# Patient Record
Sex: FEMALE | Race: WHITE | NOT HISPANIC OR LATINO | Employment: UNEMPLOYED | URBAN - METROPOLITAN AREA
[De-identification: names, ages, dates, MRNs, and addresses within clinical notes are randomized per-mention and may not be internally consistent; named-entity substitution may affect disease eponyms.]

---

## 2020-10-25 ENCOUNTER — HISTORICAL (OUTPATIENT)
Dept: ADMINISTRATIVE | Facility: HOSPITAL | Age: 55
End: 2020-10-25

## 2020-11-16 ENCOUNTER — HISTORICAL (OUTPATIENT)
Dept: ADMINISTRATIVE | Facility: HOSPITAL | Age: 55
End: 2020-11-16

## 2020-11-19 LAB — FINAL CULTURE: NO GROWTH

## 2021-03-06 ENCOUNTER — HISTORICAL (OUTPATIENT)
Dept: ADMINISTRATIVE | Facility: HOSPITAL | Age: 56
End: 2021-03-06

## 2021-03-08 LAB — FINAL CULTURE: NORMAL

## 2021-10-02 ENCOUNTER — HISTORICAL (OUTPATIENT)
Dept: ADMINISTRATIVE | Facility: HOSPITAL | Age: 56
End: 2021-10-02

## 2021-10-03 ENCOUNTER — HISTORICAL (OUTPATIENT)
Dept: ADMINISTRATIVE | Facility: HOSPITAL | Age: 56
End: 2021-10-03

## 2021-10-05 LAB — FINAL CULTURE: NORMAL

## 2021-10-06 LAB — FINAL CULTURE: NO GROWTH

## 2022-03-15 ENCOUNTER — TELEPHONE (OUTPATIENT)
Dept: GASTROENTEROLOGY | Facility: CLINIC | Age: 57
End: 2022-03-15
Payer: MEDICAID
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
## 2022-03-15 NOTE — TELEPHONE ENCOUNTER
Returned call as requesting. Patient wanting to be scheduled in the GI department. Patient not in [facility] system with the exception of 1 ED visit 6 months ago for anxiety.

## 2022-04-30 NOTE — ED PROVIDER NOTES
"   Patient:   Sarah Downing            MRN: [id number]            FIN: [id number]               Age:   55 years     Sex:  Female     :  1965   Associated Diagnoses:   Acute cystitis; Diverticulitis of colon   Author:   Jose Bills MD      Basic Information   Time seen: Date & time 2020 19:12:00.   History source: Patient.   Arrival mode: Ambulance.   History limitation: None.   Additional information: Chief Complaint from Nursing Triage Note : Chief Complaint   2020 19:10 CST     Chief Complaint           Pt presents via [facility] w/ intermittent RLQ pain over the past couple of day. Burning w/ urination. Pt AAOX4 vss.  .   Provider/Visit info:   Time Seen:  Jose Bills MD / 2020 19:09  .   History of Present Illness   The patient presents with abdominal pain.  The onset was 3  days ago.  The course/duration of symptoms is fluctuating in intensity.  The character of symptoms is sharp.  The degree at onset was moderate.  The Location of pain at onset was right, lower and abdominal.  The degree at present is moderate.  The Location of pain at present is right, lower and abdominal.  Radiating pain: none. There are exacerbating factors including changing position and urination.  The relieving factor is none.  Therapy today: none.  Associated symptoms: diarrhea, denies nausea, denies vomiting, denies back pain, denies shortness of breath, denies fever and denies chills.      55-year-old female presents emergency room complaints of abdominal pain.  Symptoms began 3 days ago.  Describes it as sharp and "hurting pain".  Pain worse with urination.  Reports recently being diagnosed with a urinary tract infection, currently on Macrobid x2 days.  Denies nausea or vomiting.  Reports initially having constipation, but now having loose bowel movements for 1 day..        Review of Systems   Constitutional symptoms:  No fever, no chills.    Respiratory symptoms:  No shortness " of breath, no orthopnea.    Cardiovascular symptoms:  No chest pain, no palpitations.    Gastrointestinal symptoms:  Negative except as documented in HPI.   Genitourinary symptoms:  Dysuria, no hematuria, no vaginal bleeding.    Neurologic symptoms:  No headache, no dizziness.              Additional review of systems information: All other systems reviewed and otherwise negative.      Health Status   Allergies:    Allergic Reactions (Selected)  Severity Not Documented  HYDROcodone/potassium guaiacolsulfonate/PSE- No reactions were documented.  Sulfa drugs- No reactions were documented.,    Allergies (2) Active Reaction  HYDROcodone/potassium None Documented  guaiacolsulfonate/PSE  sulfa drugs None Documented  .   Medications:  (Selected)   Inpatient Medications  Ordered  Toradol: 30 mg, form: Injection, IV Push, Now, first dose 20 19:16:00 CST, stop date 20 19:16:00 CST, STAT  Documented Medications  Documented  Advair Diskus 100 mcg-50 mcg inhalation powder: 0 Refill(s)  Flonase 50 mcg/inh nasal spray: 1 spray(s), Nasal, BID, 0 Refill(s)  Xopenex 1.25 mg/3 mL inhalation solution: 1.25 mg = 3 mL, NEB, TID, 0 Refill(s)  ZyrTEC: 0 Refill(s).      Past Medical/ Family/ Social History   Medical history:    Active  COPD (chronic obstructive pulmonary disease) ([id number]), Reviewed as documented in chart.   Surgical history:     section ([id number]).  Hysterectomy ([id number])., Reviewed as documented in chart.   Family history:    No family history items have been selected or recorded., Reviewed as documented in chart.   Social history:    Social & Psychosocial Habits    Alcohol  10/27/2016  Frequency: 1-2 times per week    Average drinks per episode in last year: 3    Home/Environment  10/27/2016  Lives with: Children, one of my friends    Substance Use  10/27/2016  Use: Never    Tobacco  10/27/2016  Tobacco use per day: 13    Number of years: 25    10/25/2020  Use: 10 or  more cigarettes (1/    Patient Wants Consult For Cessation Counseling No    11/16/2020  Use: 10 or more cigarettes (1/    Patient Wants Consult For Cessation Counseling Yes    Abuse/Neglect  10/25/2020  SHX Any signs of abuse or neglect No    11/16/2020  SHX Any signs of abuse or neglect No    Feels unsafe at home: No    Safe place to go: Yes  , Reviewed as documented in chart.   Problem list:    Active Problems (2)  COPD (chronic obstructive pulmonary disease)   Tobacco user   .      Physical Examination               Vital Signs   Vital Signs   11/16/2020 19:10 CST     Temperature Oral          36.8 DegC                             Temperature Oral (calculated)             98.24 DegF                             SpO2                      99 %                             Oxygen Therapy            Room air                             Systolic Blood Pressure   125 mmHg                             Diastolic Blood Pressure  66 mmHg  .      No qualifying data available.   General:  Alert, no acute distress.    Skin:  Intact, moist.    Head:  Normocephalic, atraumatic.    Neck:  Supple, trachea midline, no tenderness.    Eye:  Pupils are equal, round and reactive to light, extraocular movements are intact, normal conjunctiva.    Ears, nose, mouth and throat:  Oral mucosa moist, no pharyngeal erythema or exudate.    Cardiovascular:  Regular rate and rhythm, No murmur, Normal peripheral perfusion.    Respiratory:  Lungs are clear to auscultation, respirations are non-labored, breath sounds are equal, Symmetrical chest wall expansion.    Gastrointestinal:  Soft, Non distended, Normal bowel sounds, No organomegaly, Tenderness: Mild, right lower quadrant, Guarding: Negative, Rebound: Negative.    Neurological:  Alert and oriented to person, place, time, and situation, No focal neurological deficit observed.    Psychiatric:  Cooperative, appropriate mood & affect.       Medical Decision Making   Differential Diagnosis:   Abdominal pain, Appendicitis, urinary tract infection, pyelonephritis, diverticulitis.    Documents reviewed:  Emergency department nurses' notes.   Results review:  Lab results : Lab View   11/16/2020 20:25 CST     Est Creat Clearance Ser   67.46 mL/min    11/16/2020 19:40 CST     Sodium Lvl                140 mmol/L                             Potassium Lvl             3.9 mmol/L                             Chloride                  105 mmol/L                             CO2                       24 mmol/L                             Calcium Lvl               8.9 mg/dL                             Glucose Lvl               90 mg/dL                             BUN                       10.0 mg/dL                             Creatinine                0.77 mg/dL                             eGFR-AA                   101                             eGFR-SHAN                  83 mL/min/1.73 m2  LOW                             Bili Total                0.4 mg/dL                             Bili Direct               0.2 mg/dL                             Bili Indirect             0.20 mg/dL                             AST                       18 unit/L                             ALT                       22 unit/L                             Alk Phos                  104 unit/L                             Total Protein             7.2 gm/dL                             Albumin Lvl               3.6 gm/dL                             Globulin                  3.6 gm/dL  HI                             A/G Ratio                 1.0 ratio  LOW                             WBC                       4.9 x10(3)/mcL                             RBC                       4.05 x10(6)/mcL                             Hgb                       12.9 gm/dL                             Hct                       40.1 %                             Platelet                  314 x10(3)/mcL                             MCV                       99.0  fL                             MCH                       31.9 pg                             MCHC                      32.2 gm/dL                             RDW                       12.9 %                             MPV                       9.8 fL                             Abs Neut                  2.38 x10(3)/mcL                             Neutro Auto               48 %  NA                             Lymph Auto                41 %  NA                             Mono Auto                 8 %  NA                             Eos Auto                  2 %  NA                             Abs Eos                   0.1 x10(3)/mcL                             Basophil Auto             1 %  NA                             Abs Neutro                2.38 x10(3)/mcL                             Abs Lymph                 2.0 x10(3)/mcL                             Abs Mono                  0.4 x10(3)/mcL                             Abs Baso                  0.0 x10(3)/mcL                             IG%                       0 %  NA                             IG#                       0.010  NA    11/16/2020 19:19 CST     UA Appear                 Clear                             UA Color                  DARK YELLOW                             UA Spec Grav              1.003  LOW                             UA Bili                   Negative                             UA pH                     6.5                             UA Urobilinogen           Normal                             UA Blood                  Negative                             UA Glucose                Negative                             UA Ketones                Negative                             UA Protein                Negative                             UA Nitrite                Negative                             UA Leuk Est               500 Paolo/uL                             UA WBC Interp             26-50 /HPF                              UA RBC Interp             0-2 /HPF                             UA Bact Interp            None Seen /HPF                             UA Squam Epi Interp        /LPF                             UA Hyal Cast Interp       0-2 /LPF  .   Radiology results:  Rad Results (ST)  < 12 hrs   Accession: [id number]  Order: CT Abdomen and Pelvis W Contrast  Report Dt/Tm: 11/16/2020 21:03  Report:   CT of the abdomen and pelvis without oral after IV contrast only     Clinical history is abdominal pain     Lung bases appear clear liver appears normal. Spleen appears normal.  Pancreas appears normal. Biliary system appears normal. The adrenals  are not enlarged. Kidneys appear normal. Aorta shows no evidence of an  aneurysm. There are diverticulum in the colon without evidence of  acute diverticulitis. The appendix appears normal. There is what is  felt to represent a Schmorl's noted L4.     IMPRESSION: Diverticulosis without evidence of acute diverticulitis.    .      Reexamination/ Reevaluation   Time: 11/16/2020 21:43:00 .   Notes: Feeling improved.  CT scan show diverticulosis.  With diarrhea and RLQ abdominal pain, will place on augmentin for duel coverage of diverticulitis and UTI..      Impression and Plan   Diagnosis   Acute cystitis ([id number] N30.00)   Diverticulitis of colon ([id number] K57.32)   Plan   Condition: Improved, Stable.    Disposition: Discharged: Time  11/16/2020 21:44:00, to home.    Prescriptions: Launch prescriptions   Pharmacy:  Zofran ODT 4 mg oral tablet, disintegrating (Prescribe): 4 mg = 1 tab(s), Oral, q6hr, PRN PRN nausea/vomiting, X 5 day(s), # 10 tab(s), 0 Refill(s)  ibuprofen 800 mg oral tablet (Prescribe): 800 mg = 1 tab(s), Oral, q8hr, PRN PRN as needed for pain, not to exceed 3200 mg/day, X 10 day(s), # 30 tab(s), 0 Refill(s)  Augmentin 875 mg-125 mg oral tablet (Prescribe): = 1 tab(s), Oral, q12hr, X 10 day(s), # 20 tab(s), 0 Refill(s).    Patient was given the following educational  materials: Urinary Tract Infection, Adult, Diverticulitis.    Follow up with: Anytime the conditions worsen, return to clinic; Call office to Schedule Appointment; Report to Emergency Department if symptoms return or worsen.    Counseled: Patient, Regarding diagnosis, Regarding diagnostic results, Regarding treatment plan, Regarding prescription, Patient indicated understanding of instructions.    Orders: Launch Orders   Admit/Transfer/Discharge:  Discharge (Order): 11/16/2020 21:45 CST, Home.

## 2022-04-30 NOTE — ED PROVIDER NOTES
Patient:   Sarah Downing            MRN: [id number]            FIN: [id number]               Age:   56 years     Sex:  Female     :  1965   Associated Diagnoses:   Constipation; Methamphetamine use; Left sided abdominal pain   Author:   Kiran Story MD, Rafat Sweet      Basic Information   Time seen: Date & time 10/2/2021 20:11:00, Immediately upon arrival.   History source: Patient, EMS.   Arrival mode: Ambulance.   History limitation: None.   Additional information: Chief Complaint from Nursing Triage Note : Chief Complaint   10/2/2021 20:08 CDT      Chief Complaint           c/o LLQ abd pain for 5 months  .      History of Present Illness   The patient presents with abdominal pain.  The onset was chronic.  The course/duration of symptoms is fluctuating in intensity.  The character of symptoms is crampy.  The degree at onset was minimal.  The Location of pain at onset was left and abdominal.  The degree at present is minimal.  The Location of pain at present is left and abdominal.  Radiating pain: none. The exacerbating factor is methamphetamine use.  The relieving factor is none.  Therapy today: none.  Risk factors consist of drug abuse.  Associated symptoms: none.  Additional history: she said her abdomen has been hurting for 5 months everytime she uses meth.  SHe didnt use meth today but called 911 because she wants to know why she hurts when she uses meth.        Review of Systems   Constitutional symptoms:  No fever, no chills, no sweats, no weakness, no fatigue, no decreased activity.    Skin symptoms:  No jaundice, no rash, no pruritus, no abrasions, no breakdown, no burns, no dryness, no petechiae, no lesion.    Eye symptoms:  Vision unchanged, no recent vision problems, no pain, no discharge, no icterus, no diplopia, no blurred vision, no blindness.    ENMT symptoms:  No ear pain, no sore throat, no nasal congestion, no sinus pain.    Respiratory symptoms:  No shortness of  breath, no orthopnea, no cough, no hemoptysis, no sputum production, no stridor, no wheezing.    Cardiovascular symptoms:  No chest pain, no palpitations, no tachycardia, no syncope, no diaphoresis, no peripheral edema.    Gastrointestinal symptoms:  Abdominal pain, left upper quadrant, left lower quadrant, left flank, no nausea, no vomiting, no diarrhea, no constipation, no rectal bleeding, no rectal pain.    Genitourinary symptoms:  No dysuria, no hematuria.    Musculoskeletal symptoms:  No back pain, no Muscle pain, no Joint pain, no Claudication.    Neurologic symptoms:  No headache, no dizziness, no altered level of consciousness, no numbness, no tingling, no weakness.    Psychiatric symptoms:  No anxiety, no depression, no sleeping problems, no substance abuse.    Endocrine symptoms:  No polyuria, no polydipsia, no polyphagia, no hyperglycemia, no hypoglycemia.    Hematologic/Lymphatic symptoms:  Bleeding tendency negative, bruising tendency negative, no petechiae, no gum bleeding, no swollen nodes.    Allergy/immunologic symptoms:  No seasonal allergies, no food allergies, no recurrent infections, no impaired immunity.              Additional review of systems information: All other systems reviewed and otherwise negative.      Health Status   Allergies:    Allergies (2) Active Reaction  HYDROcodone/potassium None Documented  guaiacolsulfonate/PSE  sulfa drugs None Documented  .   Medications:  (Selected)   Documented Medications  Documented  Advair Diskus 100 mcg-50 mcg inhalation powder: 0 Refill(s)  Flonase 50 mcg/inh nasal spray: 1 spray(s), Nasal, BID, 0 Refill(s)  Xopenex 1.25 mg/3 mL inhalation solution: 1.25 mg = 3 mL, NEB, TID, 0 Refill(s)  ZyrTEC: 0 Refill(s)  nitrofurantoin macrocrystals-monohydrate 100 mg oral capsule: 100 mg = 1 cap(s), Oral, BID  phenazopyridine 200 mg oral tablet: 200 mg = 1 tab(s), Oral, TID.   Menstrual history: Per nurse's notes.      Past Medical/ Family/ Social History    Medical history:    Active  COPD (chronic obstructive pulmonary disease) ([id number]), Reviewed as documented in chart.   Surgical history:     section ([id number]).  Hysterectomy ([id number])., Reviewed as documented in chart.   Family history:    No family history items have been selected or recorded..   Social history:    Social & Psychosocial Habits    Alcohol  10/27/2016  Frequency: 1-2 times per week    Average drinks per episode in last year: 3    Home/Environment  10/27/2016  Lives with: Children, one of my friends    Substance Use  10/27/2016  Use: Never    Tobacco  10/27/2016  Tobacco use per day: 13    Number of years: 25    10/25/2020  Use: 10 or more cigarettes (1/    Patient Wants Consult For Cessation Counseling No    2020  Use: 10 or more cigarettes (1/    Patient Wants Consult For Cessation Counseling Yes    2020  Use: 10 or more cigarettes (1/    Patient Wants Consult For Cessation Counseling No    10/02/2021  Use: 10 or more cigarettes (1/    Patient Wants Consult For Cessation Counseling No    Abuse/Neglect  10/25/2020  SHX Any signs of abuse or neglect No    2020  SHX Any signs of abuse or neglect No    Feels unsafe at home: No    Safe place to go: Yes    2020  SHX Any signs of abuse or neglect No    10/02/2021  SHX Any signs of abuse or neglect No  .   Problem list:    Active Problems (2)  COPD (chronic obstructive pulmonary disease)   Tobacco user   .      Physical Examination               Vital Signs   Vital Signs   10/2/2021 20:08 CDT      Temperature Oral          37 DegC                             Temperature Oral (calculated)             98.60 DegF                             Peripheral Pulse Rate     76 bpm                             Respiratory Rate          20 br/min                             SpO2                      100 %                             Oxygen Therapy            Room air                             Systolic  Blood Pressure   136 mmHg                             Diastolic Blood Pressure  75 mmHg  .   General:  Alert, no acute distress.    Skin:  Warm, dry, intact, no pallor, no rash, normal for ethnicity.    Head:  Normocephalic, atraumatic.    Neck:  Supple, trachea midline, no tenderness   Eye:  Extraocular movements are intact, normal conjunctiva, vision grossly normal   Ears, nose, mouth and throat:  Oral mucosa moist   Cardiovascular:  Regular rate and rhythm   Respiratory:  Lungs are clear to auscultation, respirations are non-labored   Chest wall:  No tenderness, No deformity.    Back:  Nontender, Normal range of motion   Musculoskeletal:  Normal ROM, no tenderness, no deformity   Gastrointestinal:  Soft, Nontender, Non distended, Normal bowel sounds.    Neurological:  Alert and oriented to person, place, time, and situation, No focal neurological deficit observed, normal speech observed   Lymphatics:  No lymphadenopathy.   Psychiatric:  Cooperative      Medical Decision Making   Differential Diagnosis:  Abdominal pain, Appendicitis, renal stone, ureteral stone, pancreatitis, irritable bowel syndrome, urinary tract infection, pyelonephritis, gastroesophageal reflux disease, gastroenteritis, peptic ulcer disease, gastritis, diverticulitis, constipation.    Documents reviewed:  Emergency department nurses' notes, emergency department records, prior records.    Orders  Launch Order Profile (Selected)   Inpatient Orders  Ordered (Collected)  Urine Culture [id number]: Stat collect, 10/02/21 20:15:00 CDT, Urine, Collected, Nurse collect, [id number], Stop date 10/02/21 20:15:00 CDT  Ordered (Exam Completed)  XR Abdomen Flat and Erect: Stat, 10/02/21 20:18:00 CDT, Abdominal Pain, None, Stretcher, Rad Type, Not Scheduled, 10/02/21 20:18:00 CDT.   Results review:  Lab results : Lab View   10/2/2021 20:59 CDT      Est Creat Clearance Ser   86.67 mL/min    10/2/2021 20:35 CDT      Sodium Lvl                143 mmol/L                              Potassium Lvl             3.5 mmol/L                             Chloride                  106 mmol/L                             CO2                       28 mmol/L                             Calcium Lvl               9.5 mg/dL                             Glucose Lvl               97 mg/dL                             BUN                       8.0 mg/dL  LOW                             Creatinine                0.82 mg/dL                             eGFR-AA                   >60  NA                             eGFR-SHAN                  >60 mls/min/1.73/m2  NA                             Amylase Lvl               69 unit/L                             Bili Total                0.2 mg/dL                             Bili Direct               0.1 mg/dL                             Bili Indirect             0.10 mg/dL                             AST                       15 unit/L                             ALT                       16 unit/L                             Alk Phos                  86 unit/L                             Total Protein             7.1 gm/dL                             Albumin Lvl               3.6 gm/dL                             Globulin                  3.5 gm/dL                             A/G Ratio                 1.0 ratio  LOW                             Lipase Lvl                40 U/L                             WBC                       5.4 x10(3)/mcL                             RBC                       4.18 x10(6)/mcL  LOW                             Hgb                       12.9 gm/dL                             Hct                       39.3 %                             Platelet                  268 x10(3)/mcL                             MCV                       94 fL                             MCH                       31 pg                             MCHC                      33 gm/dL                             RDW                       12.3 %                              MPV                       10.0 fL                             Abs Neut                  3.0 x10(3)/mcL                             Neutro Auto               56 %                             Lymph Auto                36 %                             Mono Auto                 5 %                             Eos Auto                  2 %                             Abs Eos                   0.1 x10(3)/mcL                             Basophil Auto             1 %                             Abs Neutro                3.0 x10(3)/mcL                             Abs Lymph                 1.9 x10(3)/mcL                             Abs Mono                  0.3 x10(3)/mcL                             Abs Baso                  0.0 x10(3)/mcL                             IG%                       0.200 %                             IG#                       0.0100                             Ethanol Lvl               <10.0 mg/dL  NA    10/2/2021 20:15 CDT      U pH                      7  NA                             UA Appear                 Clear                             UA Color                  Yellow                             UA Spec Grav              1.010                             UA Bili                   Negative                             UA pH                     7.0                             UA Urobilinogen           0.2 EU/dL                             UA Blood                  Negative                             UA Glucose                Negative mg/dL                             UA Ketones                Negative mg/dL                             UA Protein                Negative mg/dL                             UA Nitrite                Negative                             UA Leuk Est               Trace                             UA WBC                    0-2 /HPF                             UA RBC                    Rare /HPF                             UA Bacteria                Rare /HPF                             UA Squam Epithelial       Rare /LPF                             U Amph Scr                Negative                             U Lora Scr                Negative                             U Benzodia Scr            Negative                             U Cannab Scr              Negative                             U Cocaine Scr             Negative                             U Opiate Scr              Negative                             U Phencyclidine Scr       Negative                             U Methadone               Negative  .   Radiology results:  10/2/2021 21:17; Kiran Story MD, Rafat Sweet; Negative; Unremarkable bowel/gas pattern without free air or evidence of bowel obstruction.      Impression and Plan   Diagnosis   Constipation (BMD37-FD K59.00)   Methamphetamine use (WNU92-VQ F15.10)   Left sided abdominal pain (CDB12-CW R10.9)   Plan   Condition: Stable.    Disposition: Medically cleared, Discharged: Time  10/2/2021 21:22:00, to home.    Prescriptions: Launch Meds List (Selected)   Inpatient Medications  Ordered  lactulose 10 g/15 mL oral syrup: 30 mL, 20 gm =, form: Syrup, Oral, Once, first dose 10/02/21 21:17:00 CDT, stop date 10/02/21 21:17:00 CDT, STAT  Documented Medications  Documented  Advair Diskus 100 mcg-50 mcg inhalation powder: 0 Refill(s)  Flonase 50 mcg/inh nasal spray: 1 spray(s), Nasal, BID, 0 Refill(s)  Xopenex 1.25 mg/3 mL inhalation solution: 1.25 mg = 3 mL, NEB, TID, 0 Refill(s)  ZyrTEC: 0 Refill(s)  nitrofurantoin macrocrystals-monohydrate 100 mg oral capsule: 100 mg = 1 cap(s), Oral, BID  phenazopyridine 200 mg oral tablet: 200 mg = 1 tab(s), Oral, TID.    Patient was given the following educational materials: Constipation, Adult, Chronic Constipation.    Follow up with: PMD/Family Doc Within 1 to 2 days Take medicines as prescribed    See your family doctor in one to 2 days for further evaluation, workup, and treatment as  necessary    Avoid driving or operating machinery while taking medicines as some medicines might cause drowsiness and may cause problems.    The exam and treatment you received in Emergency Room was for an urgent problem and NOT INTENDED AS COMPLETE CARE. It is important that you FOLLOW UP with a doctor for ongoing care. If your symptoms become WORSE or you DO NOT IMPROVE and you are unable to reach your health care provider, you should RETURN to the emergency department. The Emergency Room doctor has provided a PRELIMINARY INTERPRETATION of all your STUDIES. A final interpretation may be done after you are discharged. IF A CHANGE in your diagnosis or treatment is needed WE WILL CONTACT YOU. It is critical that we have a CURRENT PHONE NUMBER FOR YOU.  .    Counseled: Patient, Regarding diagnosis, Regarding diagnostic results, Regarding treatment plan, Regarding prescription, Patient indicated understanding of instructions.    Orders: Launch Orders   Admit/Transfer/Discharge:  Discharge (Order): 10/2/2021 21:22 CDT, Home.       Addendum    Shu Zarco is a certified MA and was present during entire interaction with this patient.
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
## 2022-05-02 ENCOUNTER — TELEPHONE (OUTPATIENT)
Dept: GASTROENTEROLOGY | Facility: CLINIC | Age: 57
End: 2022-05-02
Payer: MEDICAID

## 2022-05-02 NOTE — TELEPHONE ENCOUNTER
----- Message from Delores Guido LPN sent at 4/28/2022  4:47 PM CDT -----  Contact: self    ----- Message -----  From: Daniel Hutchison  Sent: 4/28/2022  10:08 AM CDT  To: [facility] Staff    Sarah Downing would like a call back at [phone], in regards to rescheduling her appointment that was on 03/15/22.

## 2022-05-02 NOTE — TELEPHONE ENCOUNTER
----- Message from Delores Guido LPN sent at 4/28/2022  4:47 PM CDT -----  Contact: self    ----- Message -----  From: Daniel Hutchison  Sent: 4/28/2022  10:08 AM CDT  To: Jerry [facility] Staff    Sarah Downing would like a call back at [phone], in regards to rescheduling her appointment that was on 03/15/22.

## 2022-05-02 NOTE — TELEPHONE ENCOUNTER
Called patient and spoke with family member (cousin). Cousin stated that patient was not available but she helps cousin due to some mental issues and she would be able to get her schedud. Phone disconnected shortly after.